# Patient Record
Sex: FEMALE | Race: WHITE
[De-identification: names, ages, dates, MRNs, and addresses within clinical notes are randomized per-mention and may not be internally consistent; named-entity substitution may affect disease eponyms.]

---

## 2018-01-20 ENCOUNTER — HOSPITAL ENCOUNTER (EMERGENCY)
Dept: HOSPITAL 74 - JER | Age: 38
Discharge: HOME | End: 2018-01-20
Payer: SELF-PAY

## 2018-01-20 VITALS — TEMPERATURE: 98.7 F | HEART RATE: 79 BPM | DIASTOLIC BLOOD PRESSURE: 83 MMHG | SYSTOLIC BLOOD PRESSURE: 137 MMHG

## 2018-01-20 VITALS — BODY MASS INDEX: 35.7 KG/M2

## 2018-01-20 DIAGNOSIS — J20.9: Primary | ICD-10-CM

## 2018-01-20 NOTE — PDOC
History of Present Illness





- History of Present Illness


Initial Comments: 





01/20/18 05:12


Patient is a 37 F with no significant PMHx, who present to the ED with 3 days 

cough. Patient reports she has been sick for 2 weeks but has worsening cough 

for the past 3 days which she says is worse at night time. Patient reports that 

it hurt her chest when she coughs. She says that she has a flight to [place] 

[place] this afternoon and wanted to make sure she is okay to fly. She denies sick 

contacts. Denies any PMHx. Denies receiving flu shot. LMP 1-2 weeks ago. 

Reports normal appetite. 








<Macrina Lni - Last Filed: 01/20/18 05:12>





<Juana Hobson - Last Filed: 01/20/18 06:55>





- General


Chief Complaint: Respiratory


Stated Complaint: COUGHING WITH PAIN TO CHEST


Time Seen by Provider: 01/20/18 04:00





Past History





<Macrina Lin - Last Filed: 01/20/18 05:12>





- Past Medical History


COPD: No





- Suicide/Smoking/Psychosocial Hx


Smoking History: Never smoked


Hx Alcohol Use: No


Drug/Substance Use Hx: No





<Juana Hobson - Last Filed: 01/20/18 06:55>





- Past Medical History


Allergies/Adverse Reactions: 


 Allergies











Allergy/AdvReac Type Severity Reaction Status Date / Time


 


No Known Allergies Allergy   Verified 01/20/18 03:49











Home Medications: 


Ambulatory Orders





Azithromycin [Zithromax -] 250 mg PO DAILY #4 tablet 01/20/18 











**Review of Systems





- Review of Systems


Comments:: 





01/20/18 05:12


GENERAL/CONSTITUTIONAL: No fever or chills. No weakness.


HEAD, EYES, EARS, NOSE AND THROAT: No change in vision. No ear pain or 

discharge. No sore throat.


CARDIOVASCULAR: +chest discomfort, no shortness of breath.


RESPIRATORY: +cough, no wheezing, or hemoptysis.


GASTROINTESTINAL: No nausea, vomiting, diarrhea or constipation.


GENITOURINARY: No dysuria, frequency, or change in urination.


MUSCULOSKELETAL: No joint or muscle swelling or pain. No neck or back pain.


SKIN: No rash


NEUROLOGIC: No headache, vertigo, loss of consciousness, or change in strength/

sensation.


ENDOCRINE: No increased thirst. No abnormal weight change.


HEMATOLOGIC/LYMPHATIC: No anemia, easy bleeding, or history of blood clots.


ALLERGIC/IMMUNOLOGIC: No hives or skin allergy.








<Macrina Lin - Last Filed: 01/20/18 05:12>





*Physical Exam





- Vital Signs


 Last Vital Signs











Temp Pulse Resp BP Pulse Ox


 


 98.7 F   79      137/83   99 


 


 01/20/18 03:36  01/20/18 03:36     01/20/18 03:36  01/20/18 03:36














- Physical Exam


Comments: 





01/20/18 05:13


GENERAL: Awake, alert, and fully oriented, in no acute distress


HEAD: No signs of trauma


EYES: PERRLA, EOMI, sclera anicteric, conjunctiva clear


ENT: Auricles normal inspection, hearing grossly normal, nares patent, 

oropharynx clear without exudates. Moist mucosa


NECK: Normal ROM, supple, no lymphadenopathy, JVD, or masses


LUNGS: Breath sounds equal, clear to auscultation bilaterally.  No wheezes, and 

no crackles


HEART: Regular rate and rhythm, normal S1 and S2, no murmurs, rubs or gallops


ABDOMEN: Soft, nontender, normoactive bowel sounds.  No guarding, no rebound.  

No masses


EXTREMITIES: Normal range of motion, no edema.  No clubbing or cyanosis. No 

cords, erythema, or tenderness


NEUROLOGICAL: Cranial nerves II through XII grossly intact.  Normal speech, 

normal gait


SKIN: Warm, Dry, normal turgor, no rashes or lesions noted.








<Macrina Lin - Last Filed: 01/20/18 05:12>





- Vital Signs


 Last Vital Signs











Temp Pulse Resp BP Pulse Ox


 


 98.7 F   79      137/83   99 


 


 01/20/18 03:36  01/20/18 03:36     01/20/18 03:36  01/20/18 03:36














<Juana Hobson - Last Filed: 01/20/18 06:55>





*DC/Admit/Observation/Transfer





- Attestations


Scribe Attestion: 





01/20/18 05:13





Documentation prepared by Macrina Lin, acting as medical scribe for 

Juana Hobson MD.





<Macrina Lin - Last Filed: 01/20/18 05:12>





- Discharge Dispostion


Admit: No





<Juana Hobson - Last Filed: 01/20/18 06:55>


Diagnosis at time of Disposition: 


 Bronchitis








- Discharge Dispostion


Disposition: HOME


Condition at time of disposition: Stable





- Prescriptions


Prescriptions: 


Azithromycin [Zithromax -] 250 mg PO DAILY #4 tablet





- Patient Instructions


Printed Discharge Instructions:  DI for Acute Bronchitis